# Patient Record
Sex: MALE | Race: WHITE
[De-identification: names, ages, dates, MRNs, and addresses within clinical notes are randomized per-mention and may not be internally consistent; named-entity substitution may affect disease eponyms.]

---

## 2018-09-15 ENCOUNTER — HOSPITAL ENCOUNTER (EMERGENCY)
Dept: HOSPITAL 80 - FED | Age: 68
Discharge: HOME | End: 2018-09-15
Payer: COMMERCIAL

## 2018-09-15 VITALS — DIASTOLIC BLOOD PRESSURE: 75 MMHG | SYSTOLIC BLOOD PRESSURE: 124 MMHG

## 2018-09-15 DIAGNOSIS — K40.91: Primary | ICD-10-CM

## 2018-09-15 PROCEDURE — 74177 CT ABD & PELVIS W/CONTRAST: CPT

## 2018-09-15 PROCEDURE — 99285 EMERGENCY DEPT VISIT HI MDM: CPT

## 2018-09-15 NOTE — EDPHY
H & P


Stated Complaint: constipaton intermittently for past year


Time Seen by Provider: 09/15/18 10:12





- Personal History


Tetanus Vaccine Date: >10 years





- Medical/Surgical History


Hx Asthma: No


Hx Chronic Respiratory Disease: No


Hx Diabetes: No


Hx Cardiac Disease: Yes


Hx Renal Disease: No


Hx Cirrhosis: No


Hx Alcoholism: No


Hx HIV/AIDS: No


Hx Splenectomy or Spleen Trauma: No


Other PMH: CAD, stent, appy





- Social History


Smoking Status: Never smoked


Constitutional: 


 Initial Vital Signs











Temperature (C)  36.6 C   09/15/18 10:08


 


Heart Rate  105 H  09/15/18 10:08


 


Respiratory Rate  18   09/15/18 10:08


 


Blood Pressure  151/104 H  09/15/18 10:08


 


O2 Sat (%)  95   09/15/18 10:08








 











O2 Delivery Mode               Room Air














Allergies/Adverse Reactions: 


 





No Known Allergies Allergy (Verified 09/15/18 10:07)


 








Home Medications: 














 Medication  Instructions  Recorded


 


NK [No Known Home Meds]  09/15/18














Medical Decision Making





- Diagnostics


Imaging Results: 


 Imaging Impressions





Abdomen CT  09/15/18 10:18


Impression:


1. The distal descending colon extends into the superficial aspect of the left 

inguinal canal. This could contribute to intermittent bowel obstruction.


2. No CT evidence of appendicitis, abscess or acute bowel obstruction. 


3. Severe spinal stenosis at L3-L4 and right-sided neuroforaminal stenosis at L4

-L5. 


 


Findings discussed with Guy Benitez MD  at  11:59 hour, 9/15/2018.











Imaging: Discussed imaging studies w/ On call Radiologist, I viewed and 

interpreted images myself


ED Course/Re-evaluation: 





CHIEF COMPLAINT: Constipation





HISTORY OF PRESENT ILLNESS:  


The patient is a 67 y/o male with a history of a cardiac stent and appendectomy 

complaining of intermittent, worsening constipation for the past year. He 

denies any pain, but is concerned that there is an obstruction. He believes his 

last normal bowel movement was several weeks ago, but states he does have a 

bowel movement every other day. Milk of magnesia alleviates his symptoms. He 

denies having a recent colonoscopy. No chest pain, shortness of breath, urinary 

complaints, numbness, paresthesias, fevers.





REVIEW OF SYSTEMS:  





A comprehensive 10 system review of systems is otherwise negative aside from 

elements mentioned in the history of present illness and medical decision 

making.





PHYSICAL EXAM:  





HR, BP, O2 Sat, RR.  Temp noted


General Appearance:  Alert, well hydrated, appropriate, and non-toxic appearing.


Head:  Atraumatic without scalp tenderness or obvious injury


Eyes:  Pupils equal, round, reactive to light and accommodation, EOMI, no trauma

, no injection.


Ears:  Clear bilaterally, no perforation, normal landmarks


Nose:  Atraumatic, no rhinorrhea, clear.


Throat:  There is no erythema or exudates, no lesions, normal tonsils, mucus 

membranes moist.


Neck:  Supple, 2+ carotid upstroke, nontender, no lymphadenopathy.


Respiratory:  No retractions, no distress, no wheezes, and no accessory muscle 

use.  Lungs are clear to auscultation bilaterally.


Cardiovascular:  Regular rate and rhythm, no murmurs, rubs, or gallops. 

Bilateral carotid, radial, dorsalis pedis, and posterior tibial pulses intact. 

Good capillary refill all extremities.


Gastrointestinal:  Abdomen is soft, nontender, non-distended, no masses, no 

rebound, no guarding, no peritoneal signs.


Musculoskeletal:  Normal active ROM of all extremities, atraumatic.


Neurological:  Alert, appropriate, and interactive.  The patient has normal 

DTRs and non-focal cranial nerves, motor, sensory, and cerebellar exam.


Skin:  No rashes, good turgor, no nodules on palpation.





Past medical history: CAD


Past surgical history: Cardiac stent, appendectomy


Family history: Denies


Social history: Lives in [place], single, employed





DIAGNOSTICS/PROCEDURES/CRITICAL CARE TIME:  





Abdominopelvic CT: Descending colon is intermittently going into his inguinal 

canal.





DIFFERENTIAL DIAGNOSIS:   


The differential diagnosis for the patient's abdominal pain included but was 

not limited to appendicitis, cholecystitis, hernias, testicular torsion, 

gastritis, and urinary tract infection.





MEDICAL DECISION MAKING: 


The patient is a 67 y/o male with a history of a cardiac stent and appendectomy 

presenting with intermittent, worsening constipation for the past year. The 

patient has a normal exam including a soft and benign abdomen. Abdominopelvic 

CT and I-Stat ordered.





1201: I spoke with Dr. Heart, radiologist, who reports the patient has a left 

inguinal hernia. His descending colon is intermittently going into his inguinal 

canal.





1204: Reassessed patient and discussed laboratory and imaging findings. I have 

advised him to follow up with Dr. Germain, general surgeon, regarding the 

intermittent left-sided inguinal hernia. Return precautions provided; patient 

is comfortable with this plan.








- Data Points


Laboratory Results: 


 











  09/15/18





  10:37


 


POC Hgb  16.3 gm/dL gm/dL





   (13.7-17.5) 


 


POC Hct  48 % %





   (40-51) 


 


POC Sodium  139 mEq/L mEq/L





   (135-145) 


 


POC Potassium  3.8 mEq/L mEq/L





   (3.3-5.0) 


 


POC Chloride  105 mEq/L mEq/L





   () 


 


POC BUN  12 mg/dL mg/dL





   (7-23) 


 


POC Creatinine  0.8 mg/dL mg/dL





   (0.7-1.3) 


 


POC Glucose  123 mg/dL H mg/dL





   () 











Point of Care Test Results: 


 Chemistry











  09/15/18





  10:37


 


POC Sodium  139 mEq/L mEq/L





   (135-145) 


 


POC Potassium  3.8 mEq/L mEq/L





   (3.3-5.0) 


 


POC Chloride  105 mEq/L mEq/L





   () 


 


POC BUN  12 mg/dL mg/dL





   (7-23) 


 


POC Creatinine  0.8 mg/dL mg/dL





   (0.7-1.3) 


 


POC Glucose  123 mg/dL H mg/dL





   () 








 ISTAT H&H











  09/15/18





  10:37


 


POC Hgb  16.3 gm/dL gm/dL





   (13.7-17.5) 


 


POC Hct  48 % %





   (40-51) 














Departure





- Departure


Disposition: Home, Routine, Self-Care


Clinical Impression: 


Inguinal hernia


Qualifiers:


 Obstruction and gangrene presence: without obstruction or gangrene Laterality: 

unilateral Recurrence: recurrent Qualified Code(s): K40.91 - Unilateral 

inguinal hernia, without obstruction or gangrene, recurrent





Condition: Good


Instructions:  Inguinal Hernia (ED)


Additional Instructions: 


1. Follow up with a general surgeon within the next week for the intermittent 

inguinal hernia.


2. Return to the Emergency Department for fever, chest pain, shortness of breath

, increasing pain or other worsening of condition.





Referrals: 


JAMES BAUTISTA [Primary Care Provider] - As per Instructions


Sadiq Germain MD [Medical Doctor] - As per Instructions


Report Scribed for: Guy Benitez


Report Scribed by: Lila Raza


Date of Report: 09/15/18


Time of Report: 10:13